# Patient Record
Sex: FEMALE | Race: WHITE
[De-identification: names, ages, dates, MRNs, and addresses within clinical notes are randomized per-mention and may not be internally consistent; named-entity substitution may affect disease eponyms.]

---
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
## 2023-03-17 ENCOUNTER — HOSPITAL ENCOUNTER (EMERGENCY)
Dept: HOSPITAL 15 - ER | Age: 18
LOS: 1 days | Discharge: HOME | End: 2023-03-18
Payer: MEDICAID

## 2023-03-17 VITALS — HEIGHT: 60 IN | WEIGHT: 127.21 LBS | BODY MASS INDEX: 24.97 KG/M2

## 2023-03-17 DIAGNOSIS — L02.415: Primary | ICD-10-CM

## 2023-03-17 PROCEDURE — 10060 I&D ABSCESS SIMPLE/SINGLE: CPT

## 2023-03-18 VITALS — SYSTOLIC BLOOD PRESSURE: 127 MMHG | DIASTOLIC BLOOD PRESSURE: 71 MMHG

## 2023-07-16 ENCOUNTER — HOSPITAL ENCOUNTER (EMERGENCY)
Dept: HOSPITAL 15 - ER | Age: 18
Discharge: HOME | End: 2023-07-16
Payer: MEDICAID

## 2023-07-16 VITALS — TEMPERATURE: 97.8 F

## 2023-07-16 VITALS
RESPIRATION RATE: 18 BRPM | OXYGEN SATURATION: 100 % | HEART RATE: 80 BPM | SYSTOLIC BLOOD PRESSURE: 117 MMHG | DIASTOLIC BLOOD PRESSURE: 73 MMHG

## 2023-07-16 VITALS — WEIGHT: 126.99 LBS | HEIGHT: 59 IN | BODY MASS INDEX: 25.6 KG/M2

## 2023-07-16 DIAGNOSIS — Z3A.10: ICD-10-CM

## 2023-07-16 DIAGNOSIS — O26.891: Primary | ICD-10-CM

## 2023-07-16 DIAGNOSIS — M54.50: ICD-10-CM

## 2023-07-16 DIAGNOSIS — R10.2: ICD-10-CM

## 2023-07-16 PROCEDURE — 81001 URINALYSIS AUTO W/SCOPE: CPT

## 2023-07-16 PROCEDURE — 36415 COLL VENOUS BLD VENIPUNCTURE: CPT

## 2023-07-16 PROCEDURE — 76705 ECHO EXAM OF ABDOMEN: CPT

## 2023-07-16 PROCEDURE — 84702 CHORIONIC GONADOTROPIN TEST: CPT

## 2023-10-18 ENCOUNTER — HOSPITAL ENCOUNTER (OUTPATIENT)
Dept: HOSPITAL 15 - LDRP | Age: 18
Setting detail: OBSERVATION
Discharge: HOME | End: 2023-10-18
Attending: OBSTETRICS & GYNECOLOGY | Admitting: OBSTETRICS & GYNECOLOGY
Payer: MEDICAID

## 2023-10-18 DIAGNOSIS — Z91.010: ICD-10-CM

## 2023-10-18 DIAGNOSIS — L02.214: ICD-10-CM

## 2023-10-18 DIAGNOSIS — Z3A.24: ICD-10-CM

## 2023-10-18 DIAGNOSIS — Z48.00: ICD-10-CM

## 2023-10-18 DIAGNOSIS — Z91.018: ICD-10-CM

## 2023-10-18 DIAGNOSIS — Z34.82: Primary | ICD-10-CM

## 2023-10-18 PROCEDURE — 81002 URINALYSIS NONAUTO W/O SCOPE: CPT

## 2023-10-18 PROCEDURE — 59025 FETAL NON-STRESS TEST: CPT

## 2025-02-26 ENCOUNTER — HOSPITAL ENCOUNTER (INPATIENT)
Dept: HOSPITAL 15 - ER | Age: 20
LOS: 3 days | Discharge: HOME | DRG: 720 | End: 2025-03-01
Attending: INTERNAL MEDICINE | Admitting: INTERNAL MEDICINE
Payer: MEDICAID

## 2025-02-26 VITALS
RESPIRATION RATE: 16 BRPM | DIASTOLIC BLOOD PRESSURE: 59 MMHG | TEMPERATURE: 98.9 F | SYSTOLIC BLOOD PRESSURE: 105 MMHG | OXYGEN SATURATION: 99 % | HEART RATE: 118 BPM

## 2025-02-26 VITALS — RESPIRATION RATE: 17 BRPM | OXYGEN SATURATION: 94 % | HEART RATE: 125 BPM

## 2025-02-26 VITALS — RESPIRATION RATE: 16 BRPM | OXYGEN SATURATION: 98 % | HEART RATE: 85 BPM

## 2025-02-26 VITALS
HEART RATE: 133 BPM | TEMPERATURE: 98.2 F | RESPIRATION RATE: 17 BRPM | OXYGEN SATURATION: 98 % | SYSTOLIC BLOOD PRESSURE: 112 MMHG | DIASTOLIC BLOOD PRESSURE: 67 MMHG

## 2025-02-26 VITALS
HEART RATE: 125 BPM | RESPIRATION RATE: 17 BRPM | OXYGEN SATURATION: 94 % | SYSTOLIC BLOOD PRESSURE: 114 MMHG | DIASTOLIC BLOOD PRESSURE: 67 MMHG | TEMPERATURE: 99.3 F

## 2025-02-26 VITALS — WEIGHT: 178.13 LBS | HEIGHT: 59 IN | BODY MASS INDEX: 35.91 KG/M2

## 2025-02-26 DIAGNOSIS — E86.0: ICD-10-CM

## 2025-02-26 DIAGNOSIS — G89.29: ICD-10-CM

## 2025-02-26 DIAGNOSIS — E87.21: ICD-10-CM

## 2025-02-26 DIAGNOSIS — Z79.899: ICD-10-CM

## 2025-02-26 DIAGNOSIS — Q05.9: ICD-10-CM

## 2025-02-26 DIAGNOSIS — A41.59: Primary | ICD-10-CM

## 2025-02-26 DIAGNOSIS — R73.9: ICD-10-CM

## 2025-02-26 DIAGNOSIS — N10: ICD-10-CM

## 2025-02-26 DIAGNOSIS — E87.1: ICD-10-CM

## 2025-02-26 DIAGNOSIS — B96.89: ICD-10-CM

## 2025-02-26 LAB
ANION GAP SERPL CALCULATED.3IONS-SCNC: 15 MMOL/L (ref 5–15)
BUN SERPL-MCNC: 10 MG/DL (ref 9–23)
BUN/CREAT SERPL: 8.5 (ref 10–20)
CALCIUM SERPL-MCNC: 9.3 MG/DL (ref 8.7–10.4)
CELLS COUNTED: 100
CHLORIDE SERPL-SCNC: 97 MMOL/L (ref 98–107)
CO2 SERPL-SCNC: 17 MMOL/L (ref 20–31)
GLUCOSE SERPL-MCNC: 146 MG/DL (ref 74–106)
HCT VFR BLD AUTO: 45.4 % (ref 36–46)
HGB BLD-MCNC: 15 G/DL (ref 12.2–16.2)
LACTATE PLASV-SCNC: 2.6 MMOL/L (ref 0.4–2)
MCH RBC QN AUTO: 29.7 PG (ref 28–32)
MCV RBC AUTO: 89.9 FL (ref 80–100)
POTASSIUM SERPL-SCNC: 4.3 MMOL/L (ref 3.5–5.1)
PROT UR STRIP.AUTO-MCNC: (no result) MG/DL
SODIUM SERPL-SCNC: 129 MMOL/L (ref 136–145)
WBC CLUMPS UR QL AUTO: PRESENT /HPF

## 2025-02-26 PROCEDURE — 82565 ASSAY OF CREATININE: CPT

## 2025-02-26 PROCEDURE — 96375 TX/PRO/DX INJ NEW DRUG ADDON: CPT

## 2025-02-26 PROCEDURE — 80053 COMPREHEN METABOLIC PANEL: CPT

## 2025-02-26 PROCEDURE — 99291 CRITICAL CARE FIRST HOUR: CPT

## 2025-02-26 PROCEDURE — 85027 COMPLETE CBC AUTOMATED: CPT

## 2025-02-26 PROCEDURE — 85007 BL SMEAR W/DIFF WBC COUNT: CPT

## 2025-02-26 PROCEDURE — 81025 URINE PREGNANCY TEST: CPT

## 2025-02-26 PROCEDURE — 96365 THER/PROPH/DIAG IV INF INIT: CPT

## 2025-02-26 PROCEDURE — 87077 CULTURE AEROBIC IDENTIFY: CPT

## 2025-02-26 PROCEDURE — 80048 BASIC METABOLIC PNL TOTAL CA: CPT

## 2025-02-26 PROCEDURE — 80202 ASSAY OF VANCOMYCIN: CPT

## 2025-02-26 PROCEDURE — 83605 ASSAY OF LACTIC ACID: CPT

## 2025-02-26 PROCEDURE — 87040 BLOOD CULTURE FOR BACTERIA: CPT

## 2025-02-26 PROCEDURE — 81001 URINALYSIS AUTO W/SCOPE: CPT

## 2025-02-26 PROCEDURE — 87186 SC STD MICRODIL/AGAR DIL: CPT

## 2025-02-26 PROCEDURE — 83036 HEMOGLOBIN GLYCOSYLATED A1C: CPT

## 2025-02-26 PROCEDURE — 36415 COLL VENOUS BLD VENIPUNCTURE: CPT

## 2025-02-26 PROCEDURE — 85025 COMPLETE CBC W/AUTO DIFF WBC: CPT

## 2025-02-26 RX ADMIN — MORPHINE SULFATE ONE MG: 4 INJECTION, SOLUTION INTRAMUSCULAR; INTRAVENOUS at 09:54

## 2025-02-26 RX ADMIN — SODIUM CHLORIDE SCH MLS/HR: 0.9 INJECTION, SOLUTION INTRAVENOUS at 15:30

## 2025-02-26 RX ADMIN — HYDROCODONE BITARTRATE AND ACETAMINOPHEN PRN TAB: 5; 325 TABLET ORAL at 15:24

## 2025-02-26 RX ADMIN — ONDANSETRON HYDROCHLORIDE ONE MG: 2 INJECTION, SOLUTION INTRAMUSCULAR; INTRAVENOUS at 07:44

## 2025-02-26 RX ADMIN — SODIUM CHLORIDE ONE MLS/HR: 0.9 INJECTION, SOLUTION INTRAVENOUS at 10:01

## 2025-02-26 RX ADMIN — ONDANSETRON HYDROCHLORIDE PRN MG: 2 INJECTION, SOLUTION INTRAMUSCULAR; INTRAVENOUS at 18:42

## 2025-02-26 RX ADMIN — SODIUM CHLORIDE ONE MLS/HR: 0.9 INJECTION, SOLUTION INTRAVENOUS at 07:45

## 2025-02-26 RX ADMIN — CEFTRIAXONE SODIUM ONE MLS/HR: 1 INJECTION, POWDER, FOR SOLUTION INTRAMUSCULAR; INTRAVENOUS at 09:37

## 2025-02-26 RX ADMIN — KETOROLAC TROMETHAMINE ONE MG: 30 INJECTION, SOLUTION INTRAMUSCULAR; INTRAVENOUS at 07:45

## 2025-02-26 RX ADMIN — ONDANSETRON HYDROCHLORIDE ONE MG: 2 INJECTION, SOLUTION INTRAMUSCULAR; INTRAVENOUS at 09:55

## 2025-02-26 NOTE — DVHHP2
History of Present Illness


Reason for Visit:  Back pain


History of Present Illness


Nimo Sky is a 19-year-old female with no significant past medical 

history, who came in for back pain. Patient states that the pain began a few 

days ago. She has chronic back pain, is being worked up for spina bifida and 

thought that was the cause of her pain. When he pain continued she went to 

urgent care 2 days ago and was diagnosed with sciatica pain. Her pain continued 

to worsen, prompting her to come to the ER. She states she did not realize that 

she had a UTI. Abdirizak any dysuria, or frequency.


Past Surgical History:  None


Family History:  None


Smoke:  No


ALCOHOL:  none


Drugs:  Marijuana


Lives:  with Family (boyfriend)


Domestic Violence:  Neg





Review of Systems


Constitutional:  No: Fever, Chills, Sweats, Weakness, Malaise, Other


Eyes:  No: Pain, Vision change, Conjunctivae inflammation, Eyelid inflammation, 

Other, Redness


ENT:  No: Ear pain, Ear discharge, Nose pain, Nose discharge, Nose congestion, 

Mouth pain, Mouth swelling, Throat pain, Throat swelling, Other


Respiratory:  No: Cough, Dry, Shortness of breath, SOB with excertion, Wheezing,

Hemoptysis, Pleuritic Pain, Sputum, Wheezing, Other


Cardiovascular:  No: Chest Pain, Palpitations, Orthopnea, Paroxysmal Noc. 

Dyspnea, Edema, Lt Headedness, Other


Gastrointestinal:  No: Nausea, Vomiting, Abdominal Pain, Diarrhea, Constipation,

Melena, Hematochezia, Other


Genitourinary:  No Dysuria, No Frequency, No Incontinence, No Hematuria, No 

Retention, No Other


Musculoskeletal:  back pain (bilateral flank pain, right side is worse); No: 

other, neck pain, shoulder pain, arm pain, hand pain, leg pain, foot pain


Skin:  No: Rash, Lesions, Jaundice, Bruising, Other


Neurological:  No: Weakness, Numbness, Incoordination, Change in speech, 

Confusion, Seizures, Other


Allergies:  


Coded Allergies:  


     NO KNOWN ALLERGIES (Unverified , 9/18/16)


Medications





                               Current Medications








 Medications  Dose


 Ordered  Sig/Noel


 Route  Start Time


 Stop Time Status Last Admin


Dose Admin


 


 Acetaminophen/


 Hydrocodone Bitart  1 tab  Q4HP  PRN


 PO  2/26/25 11:30


   UNV  





 


 Ondansetron HCl  4 mg  Q4HP  PRN


 IV  2/26/25 11:30


   UNV  





 


 Docusate Sodium  100 mg  BIDPRN  PRN


 PO  2/26/25 11:30


   UNV  





 


 Acetaminophen  650 mg  Q6HP  PRN


 PO  2/26/25 11:30


   UNV  














Exam


Vital Signs





Vital Signs








  Date Time  Temp Pulse Resp B/P (MAP) Pulse Ox O2 Delivery O2 Flow Rate FiO2


 


2/26/25 10:31  105 16 121/64 (83) 100   


 


2/26/25 09:42 97.6       





 97.6       


 


2/26/25 07:43      Room Air  


 


2/26/25 07:38       0 21








General Appearance:  Alert, Oriented X3, Cooperative, moderate distress


HEENT:  Atraumatic, PERRLA, EOMI, Mucous membr. moist/pink


Respiratory:  Clear to auscultation, Normal air movement


Cardiovascular:  Regular rate, Normal S1, Normal S2, No murmurs


Abdominal:  Normal bowel sounds, Soft, No tenderness, Other (back pain)


Extremities:  No clubbing, No cyanosis, No edema, Normal pulses


Skin:  No rashes, No breakdown, No significant lesion


Neuro:  Normal gait, Normal speech, Strength at 5/5 X4 ext


Psych/Mental Status:  Mental status NL, Mood NL





Labs/Xrays





                                      Labs








Test


 2/26/25


10:54 2/26/25


07:39 2/26/25


07:35 Range/Units


 


 


White Blood Count


 


 18.5 H


 


 4.4-10.8


10^3/uL


 


Red Blood Count


 


 5.05 


 


 4.0-5.20


10^6/uL


 


Hemoglobin  15.0   12.2-16.2  g/dL


 


Hematocrit  45.4   36.0-46.0  %


 


Mean Corpuscular Volume  89.9   80.0-100.0  fL


 


Mean Corpuscular Hemoglobin  29.7   28.0-32.0  pg


 


Mean Corpuscular Hemoglobin


Concent 


 33.0 


 


 32.0-36.0  g/dL





 


Red Cell Distribution Width  13.1   11.8-14.3  %


 


Platelet Count


 


 183 


 


 140-450


10^3/uL


 


Mean Platelet Volume  10.5   6.9-10.8  fL


 


Neutrophils (%) (Auto)     37.0-80.0  %


 


Lymphocytes (%) (Auto)     10.0-50.0  %


 


Monocytes (%) (Auto)     0.0-12.0  %


 


Basophils (%) (Auto)     0.0-2.0  %


 


Neutrophils # (Auto)


 


  


 


 1.6-8.6  10


^3/uL


 


Lymphocytes # (Auto)


 


  


 


 0.4-5.4  10


^3/uL


 


Monocytes # (Auto)     0-1.3  10 ^3/uL


 


Differential Total Cells


Counted 


 100.0 


 


 100  





 


Neutrophils % (Manual)  74   37.0-80.0  


 


Band Neutrophils % (Manual)  9    


 


Lymphocytes % (Manual)  13   10.0-50.0  


 


Monocytes % (Manual)  4   0-12  


 


Eosinophils % (Manual)  0   0-7  


 


Basophils % (Manual)  0   0.0-2.0  


 


Metamyelocytes % (manual)  0    


 


Myelocytes % (Manual)  0    


 


Promyelocytes % (Manual)  0    


 


Blast Cells % (Manual)  0    


 


Reactive Lymphocytes  0    


 


Platelet Estimate  Adequate    


 


Sodium Level  129 L  136-145  mmol/L


 


Potassium Level  4.3   3.5-5.1  mmol/L


 


Chloride Level  97 L    mmol/L


 


Carbon Dioxide Level  17 L  20-31  mmol/L


 


Anion Gap  15   5-15  


 


Blood Urea Nitrogen  10   9-23  mg/dL


 


Creatinine


 


 1.17 H


 


 0.550-1.02


mg/dL


 


Glomerular Filtration Rate


Calc 


 68 


 


 >90  mL/min





 


BUN/Creatinine Ratio  8.5 L  10.0-20.0  


 


Serum Glucose  146 H    mg/dL


 


Calcium Level  9.3   8.7-10.4  mg/dL


 


Urine Color   Dark yellow  Yellow  


 


Urine Clarity   Turbid H Clear  


 


Urine pH   6.5  5.0-9.0  


 


Urine Specific Gravity   1.022  1.001-1.035  


 


Urine Protein   1+ H Negative  


 


Urine Ketones   3+ H Negative  


 


Urine Blood   2+ H Negative  /uL


 


Urine Nitrite   Negative  Negative  


 


Urine Bilirubin   Negative  Negative  


 


Urine Urobilinogen   Normal  Negative  mg/dL


 


Urine Leukocyte Esterase   3+  Negative  /uL


 


Urine RBC   21  0 - 4  /hpf


 


Urine WBC Clumps   Present  None Seen  /hpf


 


Urine Microscopic WBC   662 H 0-5  /HPF


 


Urine Squamous Epithelial


Cells 


 


 Few 


 <5  /hpf





 


Urine Bacteria   None seen  None Seen  /hpf


 


Urine Mucus   Few  None Seen  


 


Urine Glucose   Normal  Normal  mg/dL


 


Urine Pregnancy Test   Negative  Negative  











Assessment/Plan


Assessment/Plan


Assessment: 


Acute pyelonephritis, 


Sepsis, 


Complicated UTI, 


Leucocytosis, 


Lactic acidosis, 


Hyponatremia, 


Hyperglycemia, 





Plan: 


Admit to Med-Surg, 


IV antibiotics, 


IV hydration, 


A1c, 


Manage/Monitor electrolytes closely,


Plan discussed with:  Patient


My Orders





                         Orders - TRENT BIRCH FNP








Procedure Category Date Status





  Time 


 


Admit ADMIT 2/26/25 Transmitted





  11:23 


 


Code Status CODE 2/26/25 Transmitted





  11:23 


 


Hydrocodone-Acet PHA 2/26/25 Logged





5/325mg Tab (Norco  11:30 


 


Ondansetron Hcl PHA 2/26/25 Logged





 (Zofran)  11:30 


 


Docusate Sodium PHA 2/26/25 Logged





Capsule (Colace  11:30 


 


Complete Blood Count LAB 2/27/25 Verified





  04:00 


 


Comprehensive LAB 2/27/25 Verified





Metabolic Panel  04:00 


 


Condition: Serious BISHNU 2/26/25 In Process





  11:23 


 


Acetaminophen Tablet PHA 2/26/25 Logged





 (Tylenol Tablet)  11:30 











Date of Service:  Feb 26, 2025


Billing Provider:  TRENT BIRCH


Common Visit Codes:  99222-INITIAL INP/OBS CARE (MOD)











TRENT BIRCH          Feb 26, 2025 11:33

## 2025-02-26 NOTE — ED.PDOC
History of Present Illness


HPI Comments


19-year-old girl previously healthy presents with 1 week of worsening right-

sided back pain associated with dysuria and polyuria.  Patient reports that the 

pain is 10/10.  She also reports having fever and chills.  He has several 

episodes of nausea and vomiting.


Chief Complaint:  Back Pain


Time Seen by MD:  07:21


Primary Care Provider:  IRVING


Allergies:  


Coded Allergies:  


     NO KNOWN ALLERGIES (Unverified , 9/18/16)


Home Meds


Active Scripts


Acetaminophen (Tylenol Extra Strength) 500 Mg Tab, 500 MG PO TIDP PRN for 20 

Days, #60 TAB 0 Refills


   Prov:MINEAPRYL NP         7/16/23


Reported Medications


Prenatal Vit W/ Ferrous Fumara (PNV PRENATAL PLUS MULTIVI) Plus Tab, 1 OR, TAB


   10/18/23


Mode of Arrival:  Ambulatory





Past Medical History


PAST MEDICAL HISTORY:  Denies


Surgical History:  Denies all surgeries


GYN History:  Denies all GYN Hx





Family History


Family History:  Reviewed,noncontributory to illness





Social History


Smoker:  Non-Smoker


Alcohol:  Denies ETOH Use


Drugs:  Denies Drug Use


Lives In:  Home





All Other Systems:  Reviewed and Negative





Physical Exam


General Appearance:  Moderate Distress


HEENT:  Normal ENT Inspection


Neck:  Non-Tender, Normal


Respiratory:  Other (Tachypnea)


Cardiovascular:  Tachycardia


Breast Exam:  Deferred


Gastrointestinal:  Suprapubic, Tenderness, Other (Right CVA tenderness)


Genitalia:  Deferred


Pelvic:  Deferred


Rectal:  Deferred


Extremities:  Normal range of motion


Neurologic:  No Motor Deficits


Cerebellar Function:  NOT DONE


Reflexes:  NOT DONE


Skin:  Dry


Lymphatic:  NOT DONE





Was a procedure done?


Was a procedure done?:  No





Differential Dx


Considerations may include:


Cystitis, pyelonephritis, sepsis, viral syndrome, sciatica, lumbar strain





X-Ray, Labs, Meds, VS





                                   Vital Signs








  Date Time  Temp Pulse Resp B/P (MAP) Pulse Ox O2 Delivery O2 Flow Rate FiO2


 


2/26/25 10:31  105 16 121/64 (83) 100   


 


2/26/25 10:29  105 16 121/64    


 


2/26/25 09:54  111 18 110/76    


 


2/26/25 09:42 97.6 111 19 110/76 (87) 99   





 97.6       


 


2/26/25 07:43  132 19  97 Room Air  


 


2/26/25 07:43 98.9 132 19 128/74 (92) 97   





 98.9       


 


2/26/25 07:38  85 16  98 Room Air* 0 21


 


2/26/25 07:37 98.9 138 16 121/83 (96) 96   








                                       Lab








Test


 2/26/25


09:00 2/26/25


07:39 2/26/25


07:35 Range/Units


 


 


Lactic Acid Level 2.6 *H   0.4-2.0  mmol/L


 


White Blood Count


 


 18.5 H


 


 4.4-10.8


10^3/uL


 


Red Blood Count


 


 5.05 


 


 4.0-5.20


10^6/uL


 


Hemoglobin  15.0   12.2-16.2  g/dL


 


Hematocrit  45.4   36.0-46.0  %


 


Mean Corpuscular Volume  89.9   80.0-100.0  fL


 


Mean Corpuscular Hemoglobin  29.7   28.0-32.0  pg


 


Mean Corpuscular Hemoglobin


Concent 


 33.0 


 


 32.0-36.0  g/dL





 


Red Cell Distribution Width  13.1   11.8-14.3  %


 


Platelet Count


 


 183 


 


 140-450


10^3/uL


 


Mean Platelet Volume  10.5   6.9-10.8  fL


 


Neutrophils (%) (Auto)     37.0-80.0  %


 


Lymphocytes (%) (Auto)     10.0-50.0  %


 


Monocytes (%) (Auto)     0.0-12.0  %


 


Basophils (%) (Auto)     0.0-2.0  %


 


Neutrophils # (Auto)


 


  


 


 1.6-8.6  10


^3/uL


 


Lymphocytes # (Auto)


 


  


 


 0.4-5.4  10


^3/uL


 


Monocytes # (Auto)     0-1.3  10 ^3/uL


 


Differential Total Cells


Counted 


 100.0 


 


 100  





 


Neutrophils % (Manual)  74   37.0-80.0  


 


Band Neutrophils % (Manual)  9    


 


Lymphocytes % (Manual)  13   10.0-50.0  


 


Monocytes % (Manual)  4   0-12  


 


Eosinophils % (Manual)  0   0-7  


 


Basophils % (Manual)  0   0.0-2.0  


 


Metamyelocytes % (manual)  0    


 


Myelocytes % (Manual)  0    


 


Promyelocytes % (Manual)  0    


 


Blast Cells % (Manual)  0    


 


Reactive Lymphocytes  0    


 


Platelet Estimate  Adequate    


 


Sodium Level  129 L  136-145  mmol/L


 


Potassium Level  4.3   3.5-5.1  mmol/L


 


Chloride Level  97 L    mmol/L


 


Carbon Dioxide Level  17 L  20-31  mmol/L


 


Anion Gap  15   5-15  


 


Blood Urea Nitrogen  10   9-23  mg/dL


 


Creatinine


 


 1.17 H


 


 0.550-1.02


mg/dL


 


Glomerular Filtration Rate


Calc 


 68 


 


 >90  mL/min





 


BUN/Creatinine Ratio  8.5 L  10.0-20.0  


 


Serum Glucose  146 H    mg/dL


 


Calcium Level  9.3   8.7-10.4  mg/dL


 


Urine Color   Dark yellow  Yellow  


 


Urine Clarity   Turbid H Clear  


 


Urine pH   6.5  5.0-9.0  


 


Urine Specific Gravity   1.022  1.001-1.035  


 


Urine Protein   1+ H Negative  


 


Urine Ketones   3+ H Negative  


 


Urine Blood   2+ H Negative  /uL


 


Urine Nitrite   Negative  Negative  


 


Urine Bilirubin   Negative  Negative  


 


Urine Urobilinogen   Normal  Negative  mg/dL


 


Urine Leukocyte Esterase   3+  Negative  /uL


 


Urine RBC   21  0 - 4  /hpf


 


Urine WBC Clumps   Present  None Seen  /hpf


 


Urine Microscopic WBC   662 H 0-5  /HPF


 


Urine Squamous Epithelial


Cells 


 


 Few 


 <5  /hpf





 


Urine Bacteria   None seen  None Seen  /hpf


 


Urine Mucus   Few  None Seen  


 


Urine Glucose   Normal  Normal  mg/dL


 


Urine Pregnancy Test   Negative  Negative  








                               Current Medications








 Medications


  (Trade)  Dose


 Ordered  Sig/Noel


 Route  Start Time


 Stop Time Status Last Admin


 


 Ketorolac


 Tromethamine


  (Toradol


 Injection)  15 mg  ONCE  ONCE


 IV  2/26/25 07:45


 2/26/25 07:46 DC 2/26/25 07:45





 


 Ondansetron HCl


  (Zofran)  4 mg  ONCE  ONCE


 IV  2/26/25 07:45


 2/26/25 07:46 DC 2/26/25 07:44





 


 Sodium Chloride  1,000 ml @ 


 1,000 mls/hr  Q1H ONCE


 IV  2/26/25 07:45


 2/26/25 08:46 DC 2/26/25 07:45





 


 Ceftriaxone Sodium  50 ml @ 


 100 mls/hr  ONCE  ONCE


 IV  2/26/25 09:00


 2/26/25 09:29 DC 2/26/25 09:37





 


 Sodium Chloride  2,000 ml @ 


 1,000 mls/hr  Q2H ONCE


 IV  2/26/25 09:00


 2/26/25 10:59  2/26/25 10:01





 


 Morphine Sulfate  4 mg  ONCE  ONCE


 IV  2/26/25 09:45


 2/26/25 09:46 DC 2/26/25 09:54





 


 Ondansetron HCl


  (Zofran)  4 mg  ONCE  ONCE


 IV  2/26/25 09:45


 2/26/25 09:46 DC 2/26/25 09:55











Time of 1ST Reevaluation:  10:45


Reevaluation 1ST:  Improved


Patient Education/Counseling:  Diagnosis, Treatment


Family Education/Counseling:  No Family Present





Departure 1


Departure


Time of Disposition:  10:45 (Patient with signs and symptoms concerning for 

pyelonephritis.  Patient is likely septic.  We will empirically cover patient 

with antibiotics IV fluids and admit patient for further workup)


Impression:  


   Primary Impression:  


   Acute pyelonephritis


   Additional Impression:  


   Sepsis


   Qualified Codes:  A41.9 - Sepsis, unspecified organism


Disposition:  09 ADMITTED AS INPATIENT


Admit to:  Med Surg


Condition:  Serious





Critical Care Note


Critical Care Time?:  Yes


Critical care comment:


Sepsis


Authorized and Performed by: Chuckie Sharma MD


Total critical care time: Approximately 35 minutes


Due to a high probability of clinically significant, life threatening 

deterioration, the patient required my highest level of preparedness to 

intervene emergently and I personally spent this critical care time directly and

personally managing the patient. This critical care time included obtaining a 

history; examining the patient; pulse oximetry; ordering and review of studies; 

arranging urgent treatment with development of a management plan; evaluation of 

patient's response to treatment; frequent reassessment; and, discussions with 

other providers.


This critical care time was performed to assess and manage the high probability 

of imminent, life-threatening deterioration that could result in multi-organ 

failure. It was exclusive of separately billable procedures and treating other 

patients and teaching time.


Please see my other sections and the rest of the note for further information on

patient assessment and treatment.





Stability


Stability form required:  No





Heart Score


Heart Score:  








Heart Score Response (Comments) Value


 


History N/A 0


 


EKG N/A 0


 


Age N/A 0


 


Risk Factors N/A 0


 


Troponin N/A 0


 


Total  0

















CHUCKIE SHARMA MD               Feb 26, 2025 10:46

## 2025-02-27 VITALS — OXYGEN SATURATION: 98 % | HEART RATE: 100 BPM | RESPIRATION RATE: 18 BRPM

## 2025-02-27 VITALS
SYSTOLIC BLOOD PRESSURE: 112 MMHG | TEMPERATURE: 98.3 F | HEART RATE: 113 BPM | RESPIRATION RATE: 18 BRPM | OXYGEN SATURATION: 99 % | DIASTOLIC BLOOD PRESSURE: 56 MMHG

## 2025-02-27 VITALS
SYSTOLIC BLOOD PRESSURE: 123 MMHG | RESPIRATION RATE: 18 BRPM | DIASTOLIC BLOOD PRESSURE: 76 MMHG | TEMPERATURE: 98.5 F | HEART RATE: 108 BPM | OXYGEN SATURATION: 98 %

## 2025-02-27 VITALS
TEMPERATURE: 100 F | HEART RATE: 130 BPM | SYSTOLIC BLOOD PRESSURE: 120 MMHG | OXYGEN SATURATION: 98 % | RESPIRATION RATE: 18 BRPM | DIASTOLIC BLOOD PRESSURE: 65 MMHG

## 2025-02-27 VITALS
SYSTOLIC BLOOD PRESSURE: 107 MMHG | HEART RATE: 112 BPM | OXYGEN SATURATION: 96 % | RESPIRATION RATE: 18 BRPM | TEMPERATURE: 99.7 F | DIASTOLIC BLOOD PRESSURE: 59 MMHG

## 2025-02-27 VITALS
RESPIRATION RATE: 18 BRPM | DIASTOLIC BLOOD PRESSURE: 69 MMHG | OXYGEN SATURATION: 98 % | TEMPERATURE: 98.4 F | HEART RATE: 91 BPM | SYSTOLIC BLOOD PRESSURE: 120 MMHG

## 2025-02-27 VITALS
OXYGEN SATURATION: 99 % | SYSTOLIC BLOOD PRESSURE: 131 MMHG | HEART RATE: 95 BPM | TEMPERATURE: 97.8 F | RESPIRATION RATE: 18 BRPM | DIASTOLIC BLOOD PRESSURE: 83 MMHG

## 2025-02-27 VITALS — OXYGEN SATURATION: 98 % | HEART RATE: 91 BPM | RESPIRATION RATE: 17 BRPM

## 2025-02-27 LAB
ALBUMIN SERPL-MCNC: 3.7 G/DL (ref 3.2–4.8)
ALP SERPL-CCNC: 65 U/L (ref 46–116)
ALT SERPL-CCNC: 21 U/L (ref 7–40)
ANION GAP SERPL CALCULATED.3IONS-SCNC: 10 MMOL/L (ref 5–15)
BILIRUB SERPL-MCNC: 0.6 MG/DL (ref 0.2–1)
BUN SERPL-MCNC: 6 MG/DL (ref 9–23)
BUN/CREAT SERPL: 7.5 (ref 10–20)
CALCIUM SERPL-MCNC: 7.7 MG/DL (ref 8.7–10.4)
CHLORIDE SERPL-SCNC: 103 MMOL/L (ref 98–107)
CO2 SERPL-SCNC: 17 MMOL/L (ref 20–31)
GLUCOSE SERPL-MCNC: 129 MG/DL (ref 74–106)
HCT VFR BLD AUTO: 37.5 % (ref 36–46)
HGB BLD-MCNC: 12.5 G/DL (ref 12.2–16.2)
MCH RBC QN AUTO: 30.3 PG (ref 28–32)
MCV RBC AUTO: 90.6 FL (ref 80–100)
NRBC BLD QL AUTO: 0 %
POTASSIUM SERPL-SCNC: 3.5 MMOL/L (ref 3.5–5.1)
PROT SERPL-MCNC: 5.7 G/DL (ref 5.7–8.2)
SODIUM SERPL-SCNC: 130 MMOL/L (ref 136–145)

## 2025-02-27 RX ADMIN — KETOROLAC TROMETHAMINE PRN MG: 30 INJECTION, SOLUTION INTRAMUSCULAR; INTRAVENOUS at 07:09

## 2025-02-27 RX ADMIN — ACETAMINOPHEN PRN MG: 325 TABLET ORAL at 11:38

## 2025-02-27 RX ADMIN — SODIUM CHLORIDE SCH MLS/HR: 0.9 INJECTION, SOLUTION INTRAVENOUS at 19:30

## 2025-02-27 RX ADMIN — CEFTRIAXONE SODIUM SCH MLS/HR: 1 INJECTION, POWDER, FOR SOLUTION INTRAMUSCULAR; INTRAVENOUS at 08:15

## 2025-02-27 NOTE — DVHPN2
Reviewed:  Care Plan, H&P, Labs, Medications, Previous Orders, Radiology


Changes from previous H/P or p:  No Changes


Eyes:  No Pain, No Vision change, No Conjunctivae inflammation, No Eyelid 

inflammation, No Other, No Redness


ENT:  No Ear pain, No Ear discharge, No Nose pain, No Nose discharge, No Nose 

congestion, No Mouth pain, No Mouth swelling, No Throat pain, No Throat 

swelling, No Other


Cardiovascular:  No Chest Pain, No Palpitations, No Orthopnea, No Paroxysmal 

Noc. Dyspnea, No Edema, No Lt Headedness, No Other


Respiratory:  No Cough, No Dry, No Shortness of breath, No SOB with excertion, 

No Wheezing, No Hemoptysis, No Pleuritic Pain, No Sputum, No Other


Gastrointestinal:  No Nausea, No Vomiting, No Abdominal Pain, No Diarrhea, No 

Constipation, No Melena, No Hematochezia, No Other


Genitourinary:  No Dysuria, No Frequency, No Incontinence, No Hematuria, No 

Retention, No Other


Musculoskeletal:  No other, No neck pain, No shoulder pain, No arm pain; back 

pain (bilateral flank pain, right side is worse); No hand pain, No leg pain, No 

foot pain


Skin:  No Rash, No Lesions, No Jaundice, No Bruising, No Other





Objective


Vitals





Vital Signs








  Date Time  Temp Pulse Resp B/P (MAP) Pulse Ox O2 Delivery O2 Flow Rate FiO2


 


2/27/25 11:38 100.0       


 


2/27/25 09:00  113 18 112/56 (74) 99   


 


2/27/25 08:00      Room Air* 0 21








Intake/Output











                               Intake and Output 


 


 2/27/25





 07:00


 


Intake Total 2810 ml


 


Balance 2810 ml


 


 


 


Intake Oral 760 ml


 


IV Total 2050 ml


 


# Voids 3








Medications





                               Current Medications








 Medications  Dose


 Ordered  Sig/Noel


 Route  Start Time


 Stop Time Status Last Admin


Dose Admin


 


 Acetaminophen/


 Hydrocodone Bitart  1 tab  Q4HP  PRN


 PO  2/26/25 11:30


    2/26/25 21:28


1 TAB


 


 Ondansetron HCl  4 mg  Q4HP  PRN


 IV  2/26/25 11:30


    2/27/25 12:04


4 MG


 


 Docusate Sodium  100 mg  BIDPRN  PRN


 PO  2/26/25 11:30


     





 


 Acetaminophen  650 mg  Q6HP  PRN


 PO  2/26/25 11:30


    2/27/25 11:38


650 MG


 


 Ceftriaxone Sodium  50 ml @ 


 100 mls/hr  DAILY@09


 IV  2/27/25 09:00


    2/27/25 08:15


100 MLS/HR


 


 Ketorolac


 Tromethamine  15 mg  Q6HPRN  PRN


 IV  2/26/25 11:30


 3/3/25 11:29  2/27/25 11:37


15 MG


 


 Sodium Chloride  1,000 ml @ 


 200 mls/hr  Q5H


 IV  2/26/25 15:30


    2/27/25 08:22


200 MLS/HR


 


 Vancomycin HCl  0 ml @ 0


 mls/hr  UD


 IV  2/27/25 03:00


     





 


 Vancomycin HCl  250 ml @ 


 200 mls/hr  Q12H


 IV  2/27/25 16:00


     














Laboratory Results


Laboratory Tests


2/27/25 04:40











Chemistry








Test


 2/27/25


04:40


 


Albumin


 3.7 g/dL


(3.2-4.8)


 


Calcium Level


 7.7 mg/dL


(8.7-10.4)  L


 


Total Protein


 5.7 g/dL


(5.7-8.2)








LFT








Test


 2/27/25


04:40


 


Alanine Aminotransferase (ALT) 21 U/L (7-40)  


 


Alkaline Phosphatase


 65 U/L


()


 


Aspartate Amino Transferase


(AST) 18 U/L (13-40)





 


Total Bilirubin


 0.6 mg/dL


(0.2-1.0)








Urinalysis








Test


 2/26/25


07:35


 


Urine Color


 Dark yellow


(Yellow)


 


Urine Clarity


 Turbid (Clear)


H


 


Urine pH 6.5 (5.0-9.0)  


 


Urine Specific Gravity


 1.022


(1.001-1.035)


 


Urine Protein


 1+ (Negative)


H


 


Urine Ketones


 3+ (Negative)


H


 


Urine Blood


 2+ /uL


(Negative)  H


 


Urine Nitrite


 Negative


(Negative)


 


Urine Bilirubin


 Negative


(Negative)


 


Urine Urobilinogen


 Normal mg/dL


(Negative)


 


Urine Leukocyte Esterase


 3+ /uL


(Negative)


 


Urine RBC


 21 /hpf (0 -


4)


 


Urine WBC Clumps


 Present /hpf


(None Seen)


 


Urine Microscopic WBC


 662 /HPF (0-5)


H


 


Urine Squamous Epithelial


Cells Few /hpf (<5)  





 


Urine Bacteria


 None seen /hpf


(None Seen)


 


Urine Mucus


 Few (None


Seen)


 


Urine Glucose


 Normal mg/dL


(Normal)


 


Urine Pregnancy Test


 Negative


(Negative)








Microbiology





                                  Microbiology








 Date/Time


Source Procedure


Growth Status





 


 2/26/25 09:13


Blood Blood Culture - Preliminary


 Resulted








Labs and/or images reviewed:  Labs reviewed by me, Image(s) reviewed by me





Assessment/Plan


Assessment/Plan


Sepsis secondary to acute urinary tract infection:  Blood cultures urine 

cultures


Acute urinary tract infection:  Rocepjason DC vancomycin


Acute pyelonephritis


History of spina bifida being worked out as an outpatient


Acute dehydration: IV fluids


Acute abdominal pain


Acute Hyponatremia sodium 129: IV fluids


Acute lactic acidosis


Time spent 45 minutes


Plan discussed with:  Patient


My Orders





                            Orders - GEOFF MONK MD








Procedure Category Date Status





  Time 


 


Urine Bacterial MALIK 2/27/25 Transmitted





Culture  12:35 











Date of Service:  Feb 27, 2025


Billing Provider:  GEOFF MONK MD


Common Visit Codes:  [id number] INP/OBS CARE(HIGH)











GEOFF MONK MD                Feb 27, 2025 12:38

## 2025-02-28 VITALS
OXYGEN SATURATION: 97 % | RESPIRATION RATE: 17 BRPM | DIASTOLIC BLOOD PRESSURE: 73 MMHG | SYSTOLIC BLOOD PRESSURE: 127 MMHG | TEMPERATURE: 98.3 F | HEART RATE: 79 BPM

## 2025-02-28 VITALS
OXYGEN SATURATION: 98 % | SYSTOLIC BLOOD PRESSURE: 135 MMHG | RESPIRATION RATE: 20 BRPM | DIASTOLIC BLOOD PRESSURE: 96 MMHG | TEMPERATURE: 92.9 F | HEART RATE: 73 BPM

## 2025-02-28 VITALS
RESPIRATION RATE: 18 BRPM | SYSTOLIC BLOOD PRESSURE: 118 MMHG | DIASTOLIC BLOOD PRESSURE: 70 MMHG | OXYGEN SATURATION: 100 % | HEART RATE: 91 BPM | TEMPERATURE: 98.4 F

## 2025-02-28 VITALS — RESPIRATION RATE: 18 BRPM | OXYGEN SATURATION: 97 % | HEART RATE: 89 BPM

## 2025-02-28 VITALS
SYSTOLIC BLOOD PRESSURE: 127 MMHG | TEMPERATURE: 98.5 F | HEART RATE: 89 BPM | DIASTOLIC BLOOD PRESSURE: 80 MMHG | RESPIRATION RATE: 18 BRPM | OXYGEN SATURATION: 97 %

## 2025-02-28 VITALS
RESPIRATION RATE: 19 BRPM | SYSTOLIC BLOOD PRESSURE: 127 MMHG | OXYGEN SATURATION: 100 % | HEART RATE: 103 BPM | TEMPERATURE: 98.1 F | DIASTOLIC BLOOD PRESSURE: 74 MMHG

## 2025-02-28 VITALS
TEMPERATURE: 98.5 F | OXYGEN SATURATION: 99 % | SYSTOLIC BLOOD PRESSURE: 124 MMHG | HEART RATE: 82 BPM | DIASTOLIC BLOOD PRESSURE: 80 MMHG | RESPIRATION RATE: 18 BRPM

## 2025-02-28 VITALS — HEART RATE: 73 BPM | OXYGEN SATURATION: 98 % | RESPIRATION RATE: 20 BRPM

## 2025-02-28 LAB
ANION GAP SERPL CALCULATED.3IONS-SCNC: 13 MMOL/L (ref 5–15)
BUN SERPL-MCNC: < 5 MG/DL (ref 9–23)
BUN/CREAT SERPL: 6.9 (ref 10–20)
CALCIUM SERPL-MCNC: 8.2 MG/DL (ref 8.7–10.4)
CHLORIDE SERPL-SCNC: 105 MMOL/L (ref 98–107)
CO2 SERPL-SCNC: 17 MMOL/L (ref 20–31)
GLUCOSE SERPL-MCNC: 93 MG/DL (ref 74–106)
HCT VFR BLD AUTO: 36.9 % (ref 36–46)
HGB BLD-MCNC: 12.4 G/DL (ref 12.2–16.2)
MCH RBC QN AUTO: 30.3 PG (ref 28–32)
MCV RBC AUTO: 90.6 FL (ref 80–100)
NRBC BLD QL AUTO: 0 %
POTASSIUM SERPL-SCNC: 3.5 MMOL/L (ref 3.5–5.1)
SODIUM SERPL-SCNC: 135 MMOL/L (ref 136–145)

## 2025-02-28 RX ADMIN — KETOROLAC TROMETHAMINE PRN MG: 30 INJECTION, SOLUTION INTRAMUSCULAR; INTRAVENOUS at 05:39

## 2025-02-28 NOTE — DVHPN2
Subjective


I am assuming the care of the patient from today onwards who was under the care 

of hospitalist team.  Detailed sign out obtained.  This is a 19-year-old female 

with a no significant past medical history presented to the hospital with a back

pain found to have sepsis secondary to UTI as well as bacteremia which is being 

treated with the IV antibiotics.  Patient denies any abdominal pain dysuria or 

back pain.


Reviewed:  Care Plan, H&P, Labs, Medications, Previous Orders, Radiology


Changes from previous H/P or p:  No Changes


Eyes:  No Pain, No Vision change, No Conjunctivae inflammation, No Eyelid 

inflammation, No Other, No Redness


ENT:  No Ear pain, No Ear discharge, No Nose pain, No Nose discharge, No Nose 

congestion, No Mouth pain, No Mouth swelling, No Throat pain, No Throat 

swelling, No Other


Cardiovascular:  No Chest Pain, No Palpitations, No Orthopnea, No Paroxysmal 

Noc. Dyspnea, No Edema, No Lt Headedness, No Other


Respiratory:  No Cough, No Dry, No Shortness of breath, No SOB with excertion, 

No Wheezing, No Hemoptysis, No Pleuritic Pain, No Sputum, No Other


Gastrointestinal:  No Nausea, No Vomiting, No Abdominal Pain, No Diarrhea, No 

Constipation, No Melena, No Hematochezia, No Other


Genitourinary:  No Dysuria, No Frequency, No Incontinence, No Hematuria, No 

Retention, No Other


Musculoskeletal:  No other, No neck pain, No shoulder pain, No arm pain; back 

pain (bilateral flank pain, right side is worse); No hand pain, No leg pain, No 

foot pain


Skin:  No Rash, No Lesions, No Jaundice, No Bruising, No Other





Objective


Vitals





Vital Signs








  Date Time  Temp Pulse Resp B/P (MAP) Pulse Ox O2 Delivery O2 Flow Rate FiO2


 


2/28/25 17:00 98.3 79 17 127/73 (91) 97   





 98.3       


 


2/28/25 08:00      Room Air* 0 21








Intake/Output











                               Intake and Output 


 


 2/28/25





 07:00


 


Intake Total 4200 ml


 


Balance 4200 ml


 


 


 


Intake Oral 1500 ml


 


IV Total 2700 ml


 


# Voids 11


 


# Bowel Movements 2








Exam


HEENT pupils are reactive 


Neck is supple 


CV is S1-S2 regular rate and rhythm 


Respiratory bilateral clear


GI positive bowel sound


Extremity no edema


CNS no motor deficit.


Medications





                               Current Medications








 Medications  Dose


 Ordered  Sig/Noel


 Route  Start Time


 Stop Time Status Last Admin


Dose Admin


 


 Acetaminophen/


 Hydrocodone Bitart  1 tab  Q4HP  PRN


 PO  2/26/25 11:30


    2/28/25 12:49


1 TAB


 


 Ondansetron HCl  4 mg  Q4HP  PRN


 IV  2/26/25 11:30


    2/28/25 17:21


4 MG


 


 Docusate Sodium  100 mg  BIDPRN  PRN


 PO  2/26/25 11:30


     





 


 Acetaminophen  650 mg  Q6HP  PRN


 PO  2/26/25 11:30


    2/27/25 11:38


650 MG


 


 Ceftriaxone Sodium  50 ml @ 


 100 mls/hr  DAILY@09


 IV  2/27/25 09:00


    2/28/25 08:05


100 MLS/HR


 


 Vancomycin HCl  0 ml @ 0


 mls/hr  UD


 IV  2/27/25 03:00


     





 


 Ketorolac


 Tromethamine  30 mg  Q6HPRN  PRN


 IV  2/27/25 13:45


 3/4/25 13:44  2/28/25 11:48


30 MG


 


 Sodium Chloride  1,000 ml @ 


 150 mls/hr  Q6H40M


 IV  2/27/25 19:30


    2/28/25 11:48


150 MLS/HR


 


 Vancomycin HCl  250 ml @ 


 200 mls/hr  Q8H


 IV  3/1/25 00:00


     














Laboratory Results


Laboratory Tests


2/28/25 05:41











Chemistry








Test


 2/28/25


05:41


 


Calcium Level


 8.2 mg/dL


(8.7-10.4)  L








Urinalysis








Test


 2/26/25


07:35


 


Urine Color


 Dark yellow


(Yellow)


 


Urine Clarity


 Turbid (Clear)


H


 


Urine pH 6.5 (5.0-9.0)  


 


Urine Specific Gravity


 1.022


(1.001-1.035)


 


Urine Protein


 1+ (Negative)


H


 


Urine Ketones


 3+ (Negative)


H


 


Urine Blood


 2+ /uL


(Negative)  H


 


Urine Nitrite


 Negative


(Negative)


 


Urine Bilirubin


 Negative


(Negative)


 


Urine Urobilinogen


 Normal mg/dL


(Negative)


 


Urine Leukocyte Esterase


 3+ /uL


(Negative)


 


Urine RBC


 21 /hpf (0 -


4)


 


Urine WBC Clumps


 Present /hpf


(None Seen)


 


Urine Microscopic WBC


 662 /HPF (0-5)


H


 


Urine Squamous Epithelial


Cells Few /hpf (<5)  





 


Urine Bacteria


 None seen /hpf


(None Seen)


 


Urine Mucus


 Few (None


Seen)


 


Urine Glucose


 Normal mg/dL


(Normal)


 


Urine Pregnancy Test


 Negative


(Negative)








Microbiology





                                  Microbiology








 Date/Time


Source Procedure


Growth Status





 


 2/27/25 15:45


Blood Blood Culture - Preliminary


NO GROWTH AFTER 24 HOURS OF INCUBATION. Resulted











Assessment/Plan


Assessment/Plan


19-year-old female with a no significant past medical history presented to the 

hospital with a lower abdominal pain back pain found to have 


1. Acute pyelonephritis


2. UTI


3. Sepsis secondary to UTI and bacteremia 


4. Proteus mirabilis bacteremia, repeat blood cultures are negative.


-continue IV antibiotics, infectious disease consultation, discharge plan.


Plan discussed with:  Patient





Date of Service:  Feb 28, 2025


Billing Provider:  ANTWON WODOALL MD


Common Visit Codes:  NOT BILLABLE











ANTWON WOODALL MD             Feb 28, 2025 17:43

## 2025-03-01 VITALS
DIASTOLIC BLOOD PRESSURE: 96 MMHG | OXYGEN SATURATION: 98 % | RESPIRATION RATE: 20 BRPM | SYSTOLIC BLOOD PRESSURE: 135 MMHG | HEART RATE: 73 BPM | TEMPERATURE: 97.9 F

## 2025-03-01 VITALS
DIASTOLIC BLOOD PRESSURE: 78 MMHG | SYSTOLIC BLOOD PRESSURE: 134 MMHG | OXYGEN SATURATION: 100 % | RESPIRATION RATE: 18 BRPM | TEMPERATURE: 97.7 F | HEART RATE: 94 BPM

## 2025-03-01 VITALS
TEMPERATURE: 97.8 F | SYSTOLIC BLOOD PRESSURE: 131 MMHG | HEART RATE: 90 BPM | OXYGEN SATURATION: 100 % | DIASTOLIC BLOOD PRESSURE: 77 MMHG | RESPIRATION RATE: 20 BRPM

## 2025-03-01 VITALS
TEMPERATURE: 97.8 F | OXYGEN SATURATION: 98 % | HEART RATE: 78 BPM | SYSTOLIC BLOOD PRESSURE: 120 MMHG | DIASTOLIC BLOOD PRESSURE: 82 MMHG | RESPIRATION RATE: 19 BRPM

## 2025-03-01 VITALS
OXYGEN SATURATION: 100 % | RESPIRATION RATE: 20 BRPM | TEMPERATURE: 97.7 F | HEART RATE: 78 BPM | SYSTOLIC BLOOD PRESSURE: 126 MMHG | DIASTOLIC BLOOD PRESSURE: 79 MMHG

## 2025-03-01 VITALS — HEART RATE: 78 BPM | RESPIRATION RATE: 20 BRPM | OXYGEN SATURATION: 100 %

## 2025-03-01 LAB
HCT VFR BLD AUTO: 36.3 % (ref 36–46)
HGB BLD-MCNC: 12.3 G/DL (ref 12.2–16.2)
MCH RBC QN AUTO: 30.5 PG (ref 28–32)
MCV RBC AUTO: 89.9 FL (ref 80–100)
NRBC BLD QL AUTO: 0 %

## 2025-03-01 NOTE — DVHDS2
Discharge Summary


Date of Admission


Feb 26, 2025 at 11:23





Date of Discharge:


Mar 1, 2025





Labs/Diagnostic Data:





                               Laboratory Results








Test


 3/1/25


05:52 2/28/25


14:58 2/28/25


05:41 2/27/25


04:40


 


White Blood Count


 6.6 10^3/uL


(4.4-10.8) 


 


 





 


Red Blood Count


 4.04 10^6/uL


(4.0-5.20) 


 


 





 


Hemoglobin


 12.3 g/dL


(12.2-16.2) 


 


 





 


Hematocrit


 36.3 %


(36.0-46.0) 


 


 





 


Mean Corpuscular Volume


 89.9 fL


(80.0-100.0) 


 


 





 


Mean Corpuscular Hemoglobin


 30.5 pg


(28.0-32.0) 


 


 





 


Mean Corpuscular Hemoglobin


Concent 33.9 g/dL


(32.0-36.0) 


 


 





 


Red Cell Distribution Width


 13.2 %


(11.8-14.3) 


 


 





 


Platelet Count


 146 10^3/uL


(140-450) 


 


 





 


Mean Platelet Volume


 9.5 fL


(6.9-10.8) 


 


 





 


Neutrophils (%) (Auto)


 69.0 %


(37.0-80.0) 


 


 





 


Lymphocytes (%) (Auto)


 19.4 %


(10.0-50.0) 


 


 





 


Monocytes (%) (Auto)


 9.5 %


(0.0-12.0) 


 


 





 


Eosinophils (%) (Auto)


 1.8 %


(0.0-7.0) 


 


 





 


Basophils (%) (Auto)


 0.3 %


(0.0-2.0) 


 


 





 


Neutrophils # (Auto)


 4.6 10 ^3/uL


(1.6-8.6) 


 


 





 


Lymphocytes # (Auto)


 1.3 10 ^3/uL


(0.4-5.4) 


 


 





 


Monocytes # (Auto)


 0.6 10 ^3/uL


(0-1.3) 


 


 





 


Eosinophils # (Auto)


 0.1 10 ^3/uL


(0-0.8) 


 


 





 


Basophils # (Auto)


 0 10 ^3/uL


(0-0.2) 


 


 





 


Nucleated Red Blood Cells 0.0 %    


 


Creatinine


 0.71 mg/dL


(0.550-1.02) 


 


 





 


Glomerular Filtration Rate


Calc 124 mL/min


(>90) 


 


 





 


Vancomycin Level Trough


 


 5.7 ug/mL


(5-10) 


 





 


Sodium Level


 


 


 135 mmol/L


(136-145) 





 


Potassium Level


 


 


 3.5 mmol/L


(3.5-5.1) 





 


Chloride Level


 


 


 105 mmol/L


() 





 


Carbon Dioxide Level


 


 


 17 mmol/L


(20-31) 





 


Anion Gap   13 (5-15)  


 


Blood Urea Nitrogen


 


 


 < 5 mg/dL


(9-23) 





 


BUN/Creatinine Ratio


 


 


 6.9


(10.0-20.0) 





 


Serum Glucose


 


 


 93 mg/dL


() 





 


Calcium Level


 


 


 8.2 mg/dL


(8.7-10.4) 





 


Total Bilirubin


 


 


 


 0.6 mg/dL


(0.2-1.0)


 


Aspartate Amino Transferase


(AST) 


 


 


 18 U/L (13-40) 





 


Alanine Aminotransferase (ALT)    21 U/L (7-40) 


 


Alkaline Phosphatase


 


 


 


 65 U/L


()


 


Total Protein


 


 


 


 5.7 g/dL


(5.7-8.2)


 


Albumin


 


 


 


 3.7 g/dL


(3.2-4.8)


 


Test


 2/26/25


10:54 2/26/25


07:39 2/26/25


07:35 





 


Lactic Acid Level


 1.6 mmol/L


(0.4-2.0) 


 


 





 


Differential Total Cells


Counted 


 100.0 (100) 


 


 





 


Neutrophils % (Manual)  74 (37.0-80.0)   


 


Band Neutrophils % (Manual)  9   


 


Lymphocytes % (Manual)  13 (10.0-50.0)   


 


Monocytes % (Manual)  4 (0-12)   


 


Eosinophils % (Manual)  0 (0-7)   


 


Basophils % (Manual)  0 (0.0-2.0)   


 


Metamyelocytes % (manual)  0   


 


Myelocytes % (Manual)  0   


 


Promyelocytes % (Manual)  0   


 


Blast Cells % (Manual)  0   


 


Reactive Lymphocytes  0   


 


Platelet Estimate  Adequate   


 


Hemoglobin A1c


 


 5.7 % A1C


(<5.7) 


 





 


Urine Color


 


 


 Dark yellow


(Yellow) 





 


Urine Clarity   Turbid (Clear)  


 


Urine pH   6.5 (5.0-9.0)  


 


Urine Specific Gravity


 


 


 1.022


(1.001-1.035) 





 


Urine Protein   1+ (Negative)  


 


Urine Ketones   3+ (Negative)  


 


Urine Blood


 


 


 2+ /uL


(Negative) 





 


Urine Nitrite


 


 


 Negative


(Negative) 





 


Urine Bilirubin


 


 


 Negative


(Negative) 





 


Urine Urobilinogen


 


 


 Normal mg/dL


(Negative) 





 


Urine Leukocyte Esterase


 


 


 3+ /uL


(Negative) 





 


Urine RBC


 


 


 21 /hpf (0 -


4) 





 


Urine WBC Clumps


 


 


 Present /hpf


(None Seen) 





 


Urine Microscopic WBC   662 /HPF (0-5)  


 


Urine Squamous Epithelial


Cells 


 


 Few /hpf (<5) 


 





 


Urine Bacteria


 


 


 None seen /hpf


(None Seen) 





 


Urine Mucus


 


 


 Few (None


Seen) 





 


Urine Glucose


 


 


 Normal mg/dL


(Normal) 





 


Urine Pregnancy Test


 


 


 Negative


(Negative) 








                             Other Laboratory Tests


3/1/25 05:52








2/28/25 05:41











Brief Hx & Hospital Course:


19-year-old female with a no significant past medical history presented to the 

hospital with a lower abdominal pain back pain found to have sepsis secondary to

UTI and acute pyelonephritis.  Patient was treated with the IV antibiotics.  

Patient was found to have Gram-negative bacteremia with a Proteus mirabilis.  

Repeat blood cultures are negative.  [facility] was consulted 

who recommended Keflex 1 g t.i.d. for 10 more days.  Patient remains afebrile 

currently stable to be discharged.  Patient was explained in case abdominal pain

back pain comes back she needs imaging which includes x-ray abdominal CT abdomen

and pelvis patient is currently understand verbalized understanding and 

agreeable to plan.





Condition at Discharge:


Stable





Final Diagnosis/Problems List





Bacteremia 





UTI





Discharge Disposition:


Home





SNF Discharge


Will this Physician continue t:  No





Discharge Instruct/Medications


Diet:  Regular


Activity:  No Restrictions, As Tolerated


Follow Up/Referral:  


Please follow up with the PCP in 1-2 weeks.





If back pain is occurring need further imaging including x-ray abdomen and 


CT abdomen with outpatient follow up with the PCP


Medications:  


Keflex as prescribed


Discharge Statement:


"Patient was advised to return to the ER or call 911 if any headaches, 

dizziness, shortness of breath, chest pain, abdominal pain, bleeding, fevers, or

worsening of medical condition.





Patient was counseled about treatment plan, medications, possible side effects, 

patientverbalized understanding. All questions were answered to the best of my 

ability.





This discharge took greater then 30 minutes in planning, reviewing 

documentation, counseling the patient, and discussing with other team members."





ASSESSMENT


Bacteremia 


UTI





Date of Service:  Mar 1, 2025


Billing Provider:  ANTWON WOODALL MD


Common Visit Codes:  NOT BILLABLE











ANTWON WOODALL MD              Mar 1, 2025 16:07